# Patient Record
Sex: FEMALE | Race: WHITE | NOT HISPANIC OR LATINO | Employment: FULL TIME | URBAN - METROPOLITAN AREA
[De-identification: names, ages, dates, MRNs, and addresses within clinical notes are randomized per-mention and may not be internally consistent; named-entity substitution may affect disease eponyms.]

---

## 2018-09-13 ENCOUNTER — HISTORICAL (OUTPATIENT)
Dept: ADMINISTRATIVE | Facility: HOSPITAL | Age: 28
End: 2018-09-13

## 2018-09-13 LAB
ABS NEUT (OLG): 6.7
ALBUMIN SERPL-MCNC: 4.5 GM/DL (ref 3.4–5)
ALBUMIN/GLOB SERPL: 1.88 {RATIO} (ref 1.5–2.5)
ALP SERPL-CCNC: 26 UNIT/L (ref 38–126)
ALT SERPL-CCNC: 12 UNIT/L (ref 7–52)
AST SERPL-CCNC: 16 UNIT/L (ref 15–37)
BILIRUB SERPL-MCNC: 0.8 MG/DL (ref 0.2–1)
BILIRUBIN DIRECT+TOT PNL SERPL-MCNC: 0.2 MG/DL (ref 0–0.5)
BILIRUBIN DIRECT+TOT PNL SERPL-MCNC: 0.6 MG/DL
BUN SERPL-MCNC: 13 MG/DL (ref 7–18)
CALCIUM SERPL-MCNC: 9.1 MG/DL (ref 8.5–10)
CHLORIDE SERPL-SCNC: 104 MMOL/L (ref 98–107)
CHOLEST SERPL-MCNC: 142 MG/DL (ref 0–200)
CHOLEST/HDLC SERPL: 2.3 {RATIO}
CO2 SERPL-SCNC: 27 MMOL/L (ref 21–32)
CREAT SERPL-MCNC: 0.7 MG/DL (ref 0.6–1.3)
ERYTHROCYTE [DISTWIDTH] IN BLOOD BY AUTOMATED COUNT: 12.1 % (ref 11.5–17)
GLOBULIN SER-MCNC: 2.4 GM/DL (ref 1.2–3)
GLUCOSE SERPL-MCNC: 84 MG/DL (ref 74–106)
HCT VFR BLD AUTO: 42.8 % (ref 37–47)
HDLC SERPL-MCNC: 62 MG/DL (ref 35–60)
HGB BLD-MCNC: 14.4 GM/DL (ref 12–16)
LDLC SERPL CALC-MCNC: 92 MG/DL (ref 0–129)
LYMPHOCYTES # BLD AUTO: 2.3 X10(3)/MCL (ref 0.6–3.4)
LYMPHOCYTES NFR BLD AUTO: 23.7 % (ref 13–40)
MCH RBC QN AUTO: 31 PG (ref 27–31.2)
MCHC RBC AUTO-ENTMCNC: 34 GM/DL (ref 32–36)
MCV RBC AUTO: 92 FL (ref 80–94)
MONOCYTES # BLD AUTO: 0.6 X10(3)/MCL (ref 0–1.8)
MONOCYTES NFR BLD AUTO: 6.5 % (ref 0.1–24)
NEUTROPHILS NFR BLD AUTO: 69.8 % (ref 47–80)
PLATELET # BLD AUTO: 246 X10(3)/MCL (ref 130–400)
PMV BLD AUTO: 8.7 FL
POTASSIUM SERPL-SCNC: 4 MMOL/L (ref 3.5–5.1)
PROT SERPL-MCNC: 6.9 GM/DL (ref 6.4–8.2)
RBC # BLD AUTO: 4.65 X10(6)/MCL (ref 4.2–5.4)
SODIUM SERPL-SCNC: 138 MMOL/L (ref 136–145)
TRIGL SERPL-MCNC: 48 MG/DL (ref 30–150)
TSH SERPL-ACNC: 0.35 MIU/ML (ref 0.35–4.94)
VLDLC SERPL CALC-MCNC: 9.6 MG/DL
WBC # SPEC AUTO: 9.6 X10(3)/MCL (ref 4.5–11.5)

## 2021-10-21 ENCOUNTER — HISTORICAL (OUTPATIENT)
Dept: ADMINISTRATIVE | Facility: HOSPITAL | Age: 31
End: 2021-10-21

## 2021-10-21 LAB
ABS NEUT (OLG): 4.4 X10(3)/MCL (ref 2.1–9.2)
ALBUMIN SERPL-MCNC: 4.6 GM/DL (ref 3.4–5)
ALBUMIN/GLOB SERPL: 1.84 {RATIO} (ref 1.5–2.5)
ALP SERPL-CCNC: 45 UNIT/L (ref 38–126)
ALT SERPL-CCNC: 11 UNIT/L (ref 7–52)
AST SERPL-CCNC: 16 UNIT/L (ref 15–37)
BILIRUB SERPL-MCNC: 0.7 MG/DL (ref 0.2–1)
BILIRUBIN DIRECT+TOT PNL SERPL-MCNC: 0.2 MG/DL (ref 0–0.5)
BILIRUBIN DIRECT+TOT PNL SERPL-MCNC: 0.5 MG/DL
BUN SERPL-MCNC: 19 MG/DL (ref 7–18)
CALCIUM SERPL-MCNC: 9.6 MG/DL (ref 8.5–10)
CHLORIDE SERPL-SCNC: 102 MMOL/L (ref 98–107)
CO2 SERPL-SCNC: 29 MMOL/L (ref 21–32)
CREAT SERPL-MCNC: 0.55 MG/DL (ref 0.6–1.3)
ERYTHROCYTE [DISTWIDTH] IN BLOOD BY AUTOMATED COUNT: 12.2 % (ref 11.5–17)
EST CREAT CLEARANCE SER (OHS): 133.42 ML/MIN
GLOBULIN SER-MCNC: 2.6 GM/DL (ref 1.2–3)
GLUCOSE SERPL-MCNC: 99 MG/DL (ref 74–106)
HCT VFR BLD AUTO: 41.6 % (ref 37–47)
HGB BLD-MCNC: 14.2 GM/DL (ref 12–16)
LYMPHOCYTES # BLD AUTO: 2.2 X10(3)/MCL (ref 0.6–3.4)
LYMPHOCYTES NFR BLD AUTO: 29.5 % (ref 13–40)
MCH RBC QN AUTO: 29.7 PG (ref 27–31.2)
MCHC RBC AUTO-ENTMCNC: 34 GM/DL (ref 32–36)
MCV RBC AUTO: 87 FL (ref 80–94)
MONOCYTES # BLD AUTO: 0.8 X10(3)/MCL (ref 0.1–1.3)
MONOCYTES NFR BLD AUTO: 10.4 % (ref 0.1–24)
NEUTROPHILS NFR BLD AUTO: 60.1 % (ref 47–80)
PLATELET # BLD AUTO: 240 X10(3)/MCL (ref 130–400)
PMV BLD AUTO: 8.7 FL (ref 9.4–12.4)
POTASSIUM SERPL-SCNC: 4.4 MMOL/L (ref 3.5–5.1)
PROT SERPL-MCNC: 7.1 GM/DL (ref 6.4–8.2)
RBC # BLD AUTO: 4.78 X10(6)/MCL (ref 4.2–5.4)
SODIUM SERPL-SCNC: 137 MMOL/L (ref 136–145)
TSH SERPL-ACNC: 0.42 MIU/ML (ref 0.35–4.94)
WBC # SPEC AUTO: 7.4 X10(3)/MCL (ref 4.5–11.5)

## 2022-04-09 ENCOUNTER — HISTORICAL (OUTPATIENT)
Dept: ADMINISTRATIVE | Facility: HOSPITAL | Age: 32
End: 2022-04-09

## 2022-04-29 VITALS
HEIGHT: 63 IN | BODY MASS INDEX: 21.87 KG/M2 | SYSTOLIC BLOOD PRESSURE: 112 MMHG | SYSTOLIC BLOOD PRESSURE: 122 MMHG | WEIGHT: 115.31 LBS | DIASTOLIC BLOOD PRESSURE: 60 MMHG | BODY MASS INDEX: 20.43 KG/M2 | HEIGHT: 63 IN | DIASTOLIC BLOOD PRESSURE: 70 MMHG | WEIGHT: 123.44 LBS

## 2022-05-02 NOTE — HISTORICAL [facility] CERNER
This is a historical note converted from Lizzy. Formatting and pictures may have been removed.  Please reference Lizzy for original formatting and attached multimedia. Chief Complaint  wellness  History of Present Illness  31-year-old  female?presents office today for annual wellness examination and recheck of chronic conditions including insomnia/anxiety.? Up-to-date with health maintenance preventive screenings.? Recently underwent biometric?screening and lab work?at her place of employment?([facility] [employer]).? Up-to-date with Tdap. ?Up-to-date with?Covid vaccination.? Receiving influenza vaccination at her place of employment.? Establishing closely followed by?her GYN provider for annual examinations.? Denies nicotine/tobacco use. ?Denies drug use. ?Alcohol intake is reported as minimal/social/occasional.? Parents living and healthy.? Reports regular physical activity/cardiovascular exercise.  Review of Systems  ?14 point Review of Systems performed with no exceptions for new complaints other than as noted in HPI.  Physical Exam  Vitals & Measurements  HR:?76(Peripheral)? BP:?122/70?  HT:?160.02?cm? HT:?160.02?cm? WT:?56.000?kg? WT:?56?kg? BMI:?21.87?  Well developed, well nourished, NAD, alert and oriented x4  Vitals reviewed  Head: NCAT  Eyes: EOMI, conjunctiva WNL, pupils symmetric  Ears: TMs and EACs clear  Nose: Mucosa WNL, No discharge, No sinus tenderness  O/P: No erythema or exudate  Neck: supple, FROM, no LA, no thyromegaly, no bruits auscultated  CV: RRR no MRG, peripheral pulses intact  Pulmonary: Effort normal and breath sounds normal, no wheeze  Abdomen: soft, NTND, no HSM; ? NABS; no peritoneal signs ? ??  Musculoskeletal: ?no tenderness, joints wnl for acute changes, FROM, no CCE  Skin: warm, dry, intact  Neuro: no motor/sensory deficits, cranial nerves grossly intact, cerebellar function appears intact  Lymphadenopathy: no abnormalities noted  Psychiatric:  Cooperative, appropriate mood and affect, appears to have normal judgment  ?  Assessment/Plan  1.?Wellness examination?Z00.00  ?Recommend 3-4 days of 30-45 minutes sessions of brisk walking/water aerobics/resistance training as tolerating. ?Recommend limiting excess simple carbohydrates from daily dietary regimen. recommend increase lean protein and vegetable matter.? Up-to-date with health maintenance preventive screenings.  Ordered:  Preventative Health Care Est 18-39 years [id number] , Wellness examination, [facility] - [facility], 10/27/21 19:32:00 CDT  ?  2.?Insomnia?G47.00  Well-controlled on current medication regimen. ?Continue as prescribed.  ?  3.?Anxiety?F41.9  Well-controlled on current medication regimen. ?Continue as prescribed.  ?  Turn to clinic in 6 months for wellness exam.  Referrals  Clinic Follow up, Order for future visit   Problem List/Past Medical History  Ongoing  Flu vaccine need  Insomnia  Medication refill  Overactive bladder  Pectus excavatum  Seasonal allergic rhinitis  Urinary frequency  Historical  No qualifying data  Procedure/Surgical History  PET WITH TM PERF  Reconstructive repair of pectus excavatum  Tonsillectomy and adenoidectomy   Medications  sertraline 50 mg oral tablet, 50 mg= 1 tab(s), Oral, Daily, 3 refills  traZODONE 50 mg oral tablet ( Desyrel ), See Instructions  Allergies  Augmentin?(Rash)  Betadine?(Rash)  Versed?(Psychosis, Hallucinations, Anxiety)  Rynatan?(Unknown)  Social History  Abuse/Neglect  No, 10/21/2021  Alcohol  Never, 04/07/2018  Employment/School  Employed, Work/School description: [employer] 04/10/2018  Home/Environment  Lives with Spouse. Living situation: Home/Independent., 04/10/2018  Substance Use  Never, 04/07/2018  Tobacco  Never (less than 100 in lifetime), No, 10/21/2021  Family History  Deficiency of: Mother.  Disabled relative: Father.  Immunizations  Vaccine Date Status   influenza virus vaccine, inactivated 10/21/2021 Given   COVID-19 MRNA, LNP-S,  PF- Pfizer 03/10/2021 Recorded   COVID-19 MRNA, LNP-S, PF- Pfizer 02/17/2021 Recorded   influenza virus vaccine, inactivated 10/19/2020 Given   influenza virus vaccine, inactivated 10/19/2020 Given   influenza virus vaccine, inactivated 09/13/2018 Given   hepatitis B adult vaccine 11/04/2015 Recorded   hepatitis B adult vaccine 07/14/2015 Recorded   hepatitis B adult vaccine 06/03/2015 Recorded   tetanus/diphth/pertuss (Tdap) adult/adol 12/23/2013 Recorded   human papillomavirus vaccine 06/30/2008 Recorded   human papillomavirus vaccine 01/30/2008 Recorded   human papillomavirus vaccine 01/02/2008 Recorded   tetanus-diphth toxoids (Td) adult/adol 11/19/2006 Recorded   meningococcal polysaccharide vaccine 11/2006 Recorded   poliovirus vaccine, inactivated 03/18/1998 Recorded   measles/mumps/rubella virus vaccine 03/18/1998 Recorded   diphtheria/tetanus/pertussis (DTaP) ped 03/18/1995 Recorded   haemophilus b conj (PRP-OMP) vaccine 04/06/1994 Recorded   poliovirus vaccine, inactivated 04/06/1994 Recorded   diphtheria/tetanus/pertussis (DTaP) ped 04/06/1994 Recorded   measles/mumps/rubella virus vaccine 04/06/1994 Recorded   diphtheria/tetanus/pertussis (DTaP) ped 05/26/1993 Recorded   diphtheria/tetanus/pertussis (DTaP) ped 05/23/1993 Recorded   haemophilus b conj (PRP-OMP) vaccine 05/06/1993 Recorded   haemophilus b conj (PRP-OMP) vaccine 04/23/1993 Recorded   poliovirus vaccine, inactivated 04/23/1993 Recorded   hepatitis B pediatric vaccine 04/03/1993 Recorded   haemophilus b conj (PRP-OMP) vaccine 01/07/1993 Recorded   poliovirus vaccine, inactivated 01/07/1993 Recorded   diphtheria/tetanus/pertussis (DTaP) ped 01/07/1993 Recorded   hepatitis B pediatric vaccine 1992 Recorded   hepatitis B pediatric vaccine 1992 Recorded   Health Maintenance  Health Maintenance  ???Pending?(in the next year)  ??? ??OverDue  ??? ? ? ?Alcohol Misuse Screening due??01/02/21??and every 1??year(s)  ??? ??Due?  ??? ? ? ?ADL  Screening due??10/27/21??and every 1??year(s)  ??? ? ? ?Depression Screening due??10/27/21??Unknown Frequency  ??? ??Due In Future?  ??? ? ? ?Obesity Screening not due until??01/01/22??and every 1??year(s)  ??? ? ? ?Blood Pressure Screening not due until??10/21/22??and every 1??year(s)  ??? ? ? ?Body Mass Index Check not due until??10/21/22??and every 1??year(s)  ???Satisfied?(in the past 1 year)  ??? ??Satisfied?  ??? ? ? ?Blood Pressure Screening on??10/21/21.??Satisfied by Leigh Cano LPN  ??? ? ? ?Body Mass Index Check on??10/21/21.??Satisfied by Leigh Cano LPN  ??? ? ? ?Cervical Cancer Screening on??12/14/20.??Satisfied by Jeanette Jeffrey  ??? ? ? ?Influenza Vaccine on??10/21/21.??Satisfied by Elyse Armendariz MA  ??? ? ? ?Obesity Screening on??10/21/21.??Satisfied by Leigh Cano LPN  ?      Physician?was in the building when patient was?seen and examined by the nurse practitioner.? I concur with the?assessment/plan/management?of the patient.

## 2022-05-02 NOTE — HISTORICAL [facility] CERNER
This is a historical note converted from Cerjames. Formatting and pictures may have been removed.  Please reference Lizzy for original formatting and attached multimedia. Chief Complaint  wellness  History of Present Illness  The patient is a 27 year old?female here today for a complete Wellness Physical exam. The patient has?no acute complaints today. The patient also carries a diagnosis of Anxiety and Insomnia, which is assessed today- both controlled, anxiety without meds, feeling well, controlled at present, Trazodone helping with sleep. Exercise is reported as?very little until recently- started exercising with friends ?and includes?Pilates. Monitors diet on a daily basis. Previous documented weight at last office visit is?111.? Overall feeling well. UTD with gyn/pap last November. 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  Review of Systems  Constitutional:?no weight gain,?no weight loss,?no fatigue,?no fever,?no chills,?no weakness,?no trouble sleeping.- trazodone has really helped with sleeping  Eyes:?no vision loss/changes,?no glasses or contacts,?no pain,?no redness,?no blurry or double vision,?no flashing lights,?no specks,?no glaucoma,?no cataracts.?had Lasix a few years ago  Last eye exam:?within last 6 months  Head:?no headache,?no head injury,?no neck pain.?  Neck:??no lumps,?no swollen glands,?no stiffness.  Ears:?no decreased hearing,?no ringing,?no earache,?no drainage.?  Nose:?no stuffiness,?no discharge,?no itching,?no hay fever,?no nosebleeds,?no sinus pain.  Throat:?no bleeding,?no dentures,?no sore tongue,?no dry mouth,?no sore throat,?no hoarseness,?no thrush,?no non-healing sores.  Cardiovascular:?no chest pain or discomfort,?no tightness,?no palpitations,?no SOB with activity,?no difficulty breathing while supine,?no swelling,?no sudden awakening from sleep with SOB.  Vascular:?no calf pain with walking,?no leg cramping.  Respiratory:??no cough,?no sputum,?no coughing up  blood,?no SOB,?no wheezing,?no painful breathing.  Gastrointestinal:?no swallowing difficulty,?no heartburn,?no change in appetite,?no nausea,?no change in bowel habits,?no rectal bleeding,?no constipation,?no diarrhea,?no yellow eyes or skin.  Urinary:?no frequency,?no urgency,?no burning or pain,?no blood in urine,?no incontinence,?no change in urinary strength.  Musculoskeletal:?no muscle or joint pain,?no stiffness,?no back pain,?no redness of joints,?no swelling of joints,?no trauma.  Skin:?no rashes,?no lumps,?no itching,?no dryness,?color normal for ethnicity,?no hair or nail changes.  Neurologic:?no dizziness,?no fainting,?no seizures,?no weakness,?no numbness,?no tingling,?no tremors.  Psychiatric:?no nervousness,?no stress,?no depression,?no memory loss.  Endocrine:?no heat or cold intolerance,?no sweating,?no frequent urination,?no thirst,?no change in appetite.  Hematologic:?no ease of bruising,?no ease of bleeding.  ?  Physical Exam  Vitals & Measurements  HR:?72(Peripheral)? BP:?112/60?  HT:?160.02?cm? HT:?160.02?cm? WT:?52.3?kg? WT:?52.3?kg? BMI:?20.42?  General- In NAD, A&O x 4  ?   Eye- PERRL, EOMI  ?   HENT- TM/EAC clear, Nose mucosa WNL, No D/C, No Sinus Tenderness, O/P without erythema or exudates?  ?   Neck- S, No LA, No Thyromegaly, No bruits, No JVD  ?   Respiratory- CTA, No wheezing, No crackles, No rhonchi  ?   Cardiovascular- RRR W/O MGR, Pulses equal throughout  ?   Gastrointestinal- S, NT, No HSM, NABS, No masses, No peritoneal signs?  ?   Lymphatics- WNL  ?  Musculoskeletal- No tenderness, Joints WNL, FROM, Neg SLR, No CCE  ?  Integumentary- Warm, dry, intact, No lesions/rashes/hives  ?  Neurologic- No Motor/Sensory deficits, Reflexes +2 throughout, CN II-XII intact, Neg cerebellar tests  ?  ?  Assessment/Plan  1.?Insomnia  1. Refill meds x 1 year  2. No s/e- tolerating well  Ordered:  Influenza Virus Vaccine, Inactivated, 0.5 mL, IM, Once, first dose 09/13/18 13:46:00 CDT, stop date  09/13/18 13:46:00 CDT  CBC w/ Auto Diff, Routine collect, 09/13/18 13:46:00 CDT, Blood, Order for future visit, Stop date 09/13/18 13:46:00 CDT, Lab Collect, Wellness examination  Insomnia, 09/13/18 13:46:00 CDT  Clinic Follow up, *Est. 09/13/19 3:00:00 CDT, Order for future visit, Wellness examination  Insomnia, [facility]  Comprehensive Metabolic Panel, Routine collect, 09/13/18 13:46:00 CDT, Blood, Order for future visit, Stop date 09/13/18 13:46:00 CDT, Lab Collect, Wellness examination  Insomnia, 09/13/18 13:46:00 CDT  Lab Collection Request, 09/13/18 13:46:00 CDT, [facility] - [facility], 09/13/18 13:46:00 CDT  Lipid Panel, Routine collect, 09/13/18 13:46:00 CDT, Blood, Order for future visit, Stop date 09/13/18 13:46:00 CDT, Lab Collect, Wellness examination  Insomnia, 09/13/18 13:46:00 CDT  Preventative Health Care Est 18-39 years [id number] PC, Wellness examination  Insomnia, [facility], 09/13/18 13:46:00 CDT  Thyroid Stimulating Hormone, Routine collect, 09/13/18 13:46:00 CDT, Blood, Order for future visit, Stop date 09/13/18 13:46:00 CDT, Lab Collect, Wellness examination  Insomnia, 09/13/18 13:46:00 CDT  ?  2.?Flu vaccine need  1. Flu shot today  ?  Wellness examination  ?1. Wellness  - Health and Exercise? educated upon  -10% weight loss goal  -Lifestyle counseling > 20 minutes  -Diet: Healthy choices/baked/grilled  -Screening: UTD pap with GYN  -Vaccines: UTD Tdap- flu today  -LabS: CBC, CMP, TSH, LIPIDS  ?   2. Comorbidities- mentioned  ?   3. Referrals- none at present  ?   4. RTC in 12 months sooner if needed  Ordered:  Influenza Virus Vaccine, Inactivated, 0.5 mL, IM, Once, first dose 09/13/18 13:46:00 CDT, stop date 09/13/18 13:46:00 CDT  CBC w/ Auto Diff, Routine collect, 09/13/18 13:46:00 CDT, Blood, Order for future visit, Stop date 09/13/18 13:46:00 CDT, Lab Collect, Wellness examination  Insomnia, 09/13/18 13:46:00 CDT  Clinic Follow up, *Est. 09/13/19 3:00:00 CDT, Order for future visit,  Wellness examination  Insomnia, [facility]  Comprehensive Metabolic Panel, Routine collect, 09/13/18 13:46:00 CDT, Blood, Order for future visit, Stop date 09/13/18 13:46:00 CDT, Lab Collect, Wellness examination  Insomnia, 09/13/18 13:46:00 CDT  Lab Collection Request, 09/13/18 13:46:00 CDT, [facility] - [facility], 09/13/18 13:46:00 CDT  Lipid Panel, Routine collect, 09/13/18 13:46:00 CDT, Blood, Order for future visit, Stop date 09/13/18 13:46:00 CDT, Lab Collect, Wellness examination  Insomnia, 09/13/18 13:46:00 CDT  [facility] Health Care Est 18-39 years [id number] PC, Wellness examination  Insomnia, [facility] - [facility], 09/13/18 13:46:00 CDT  Thyroid Stimulating Hormone, Routine collect, 09/13/18 13:46:00 CDT, Blood, Order for future visit, Stop date 09/13/18 13:46:00 CDT, Lab Collect, Wellness examination  Insomnia, 09/13/18 13:46:00 CDT  ?  Orders:  traZODone, 150 mg = 1 tab(s), Oral, qPM, # 90 tab(s), 3 Refill(s), Pharmacy: [facility] [place]  Clinic Follow-up PRN, 09/13/18 13:46:00 CDT, [facility] - [facility], Future Order   Problem List/Past Medical History  Ongoing  Flu vaccine need  Insomnia  Medication refill  Overactive bladder  Pectus excavatum  Seasonal allergic rhinitis  Urinary frequency  Historical  No qualifying data  Procedure/Surgical History  PET WITH TM PERF  Reconstructive repair of pectus excavatum  Tonsillectomy and adenoidectomy   Medications  Blisovi Fe 1.5-30 Tablet, 1 tab(s), Oral, Daily  Influenza Virus Vaccine, Inactivated, 0.5 mL, IM, Once  traZODONE 150 mg oral tab ( Desyrel ), 150 mg= 1 tab(s), Oral, qPM, 3 refills  valACYclovir 1 g oral tablet, 0.5 gm= 0.5 tab(s), Oral, Daily, 5 refills  VESIcare 10 mg oral tablet, 10 mg= 1 tab(s), Oral, Daily, 2 refills  Allergies  Augmentin?(Rash)  Betadine?(Rash)  Versed?(Anxiety, Hallucinations, Psychosis)  Rynatan?(Unknown)  Social History  Alcohol  Never, 04/07/2018  Employment/School  Employed, Work/School description: [employer],  04/10/2018  Home/Environment  Lives with Spouse. Living situation: Home/Independent., 04/10/2018  Substance Abuse  Never, 04/07/2018  Tobacco  Never smoker, 04/07/2018  Family History  Deficiency of: Mother.  Disabled relative: Father.  Immunizations  Vaccine Date Status   hepatitis B adult vaccine 11/04/2015 Recorded   hepatitis B adult vaccine 07/14/2015 Recorded   hepatitis B adult vaccine 06/03/2015 Recorded   tetanus/diphth/pertuss (Tdap) adult/adol 12/23/2013 Recorded   human papillomavirus vaccine 06/30/2008 Recorded   human papillomavirus vaccine 01/30/2008 Recorded   human papillomavirus vaccine 01/02/2008 Recorded   tetanus-diphth toxoids (Td) adult/adol 11/19/2006 Recorded   meningococcal polysaccharide vaccine 11/2006 Recorded   poliovirus vaccine, inactivated 03/18/1998 Recorded   measles/mumps/rubella virus vaccine 03/18/1998 Recorded   diphtheria/tetanus/pertussis (DTaP) ped 03/18/1995 Recorded   poliovirus vaccine, inactivated 04/06/1994 Recorded   measles/mumps/rubella virus vaccine 04/06/1994 Recorded   diphtheria/tetanus/pertussis (DTaP) ped 04/06/1994 Recorded   haemophilus b conj (PRP-OMP) vaccine 04/06/1994 Recorded   diphtheria/tetanus/pertussis (DTaP) ped 05/26/1993 Recorded   diphtheria/tetanus/pertussis (DTaP) ped 05/23/1993 Recorded   haemophilus b conj (PRP-OMP) vaccine 05/06/1993 Recorded   poliovirus vaccine, inactivated 04/23/1993 Recorded   haemophilus b conj (PRP-OMP) vaccine 04/23/1993 Recorded   hepatitis B pediatric vaccine 04/03/1993 Recorded   poliovirus vaccine, inactivated 01/07/1993 Recorded   diphtheria/tetanus/pertussis (DTaP) ped 01/07/1993 Recorded   haemophilus b conj (PRP-OMP) vaccine 01/07/1993 Recorded   hepatitis B pediatric vaccine 1992 Recorded   hepatitis B pediatric vaccine 1992 Recorded   Health Maintenance  Health Maintenance  ???Pending?(in the next year)  ??? ??Due?  ??? ? ? ?ADL Screening due??09/13/18??and every 1??year(s)  ??? ? ? ?Alcohol  Misuse Screening due??09/13/18??and every 1??year(s)  ??? ? ? ?Depression Screening due??09/13/18??and every?  ??? ? ? ?Influenza Vaccine due??09/13/18??and every?  ???Satisfied?(in the past 1 year)  ??? ??Satisfied?  ??? ? ? ?Blood Pressure Screening on??09/13/18.??Satisfied by Leigh Cano  ??? ? ? ?Body Mass Index Check on??09/13/18.??Satisfied by Leigh Cano  ??? ? ? ?Cervical Cancer Screening on??11/30/17.??Satisfied by Bianca Cantu  ??? ? ? ?Influenza Vaccine on??09/13/18.??Satisfied by Sandra Jacinto NP  ??? ? ? ?Obesity Screening on??09/13/18.??Satisfied by Leigh Cano  ?  ?

## 2022-10-24 PROBLEM — F41.9 ANXIETY: Status: ACTIVE | Noted: 2022-10-24
